# Patient Record
(demographics unavailable — no encounter records)

---

## 2017-07-06 NOTE — RADIOLOGY REPORT (SQ)
EXAM DESCRIPTION:  CHEST PA/LATERAL



COMPLETED DATE/TIME:  7/6/2017 10:29 am



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



COMPARISON:  CT chest dated 0 7

Two-view chest 7/5/2016



EXAM PARAMETERS:  NUMBER OF VIEWS: two views

TECHNIQUE: Digital Frontal and Lateral radiographic views of the chest acquired.

RADIATION DOSE: NA

LIMITATIONS: none



FINDINGS:  LUNGS AND PLEURA: No opacities, masses or pneumothorax. No pleural effusion.

MEDIASTINUM AND HILAR STRUCTURES: No masses or contour abnormalities.

HEART AND VASCULAR STRUCTURES: Heart normal size.  No evidence for failure.

BONES: Osteoporotic

HARDWARE: Surgical clips over the right and left chest wall/lower axilla

OTHER: No other significant finding.



IMPRESSION:  No acute cardiopulmonary findings



TECHNICAL DOCUMENTATION:  JOB ID:  [id number]

 2011 [employer]- All Rights Reserved